# Patient Record
Sex: FEMALE | Race: WHITE | NOT HISPANIC OR LATINO
[De-identification: names, ages, dates, MRNs, and addresses within clinical notes are randomized per-mention and may not be internally consistent; named-entity substitution may affect disease eponyms.]

---

## 2020-12-07 ENCOUNTER — APPOINTMENT (OUTPATIENT)
Dept: NEUROLOGY | Facility: CLINIC | Age: 65
End: 2020-12-07
Payer: MEDICARE

## 2020-12-07 VITALS
WEIGHT: 174 LBS | HEIGHT: 66 IN | SYSTOLIC BLOOD PRESSURE: 133 MMHG | DIASTOLIC BLOOD PRESSURE: 74 MMHG | HEART RATE: 67 BPM | BODY MASS INDEX: 27.97 KG/M2

## 2020-12-07 DIAGNOSIS — L57.0 ACTINIC KERATOSIS: ICD-10-CM

## 2020-12-07 DIAGNOSIS — Z78.9 OTHER SPECIFIED HEALTH STATUS: ICD-10-CM

## 2020-12-07 DIAGNOSIS — Z86.39 PERSONAL HISTORY OF OTHER ENDOCRINE, NUTRITIONAL AND METABOLIC DISEASE: ICD-10-CM

## 2020-12-07 DIAGNOSIS — Z82.0 FAMILY HISTORY OF EPILEPSY AND OTHER DISEASES OF THE NERVOUS SYSTEM: ICD-10-CM

## 2020-12-07 DIAGNOSIS — Z87.2 PERSONAL HISTORY OF DISEASES OF THE SKIN AND SUBCUTANEOUS TISSUE: ICD-10-CM

## 2020-12-07 DIAGNOSIS — K31.89 OTHER DISEASES OF STOMACH AND DUODENUM: ICD-10-CM

## 2020-12-07 PROCEDURE — 99214 OFFICE O/P EST MOD 30 MIN: CPT

## 2020-12-07 RX ORDER — NIACINAMIDE 500 MG
TABLET ORAL
Refills: 0 | Status: ACTIVE | COMMUNITY

## 2020-12-07 RX ORDER — COLD-HOT PACK
50 EACH MISCELLANEOUS
Refills: 0 | Status: ACTIVE | COMMUNITY

## 2020-12-07 RX ORDER — LEVOTHYROXINE SODIUM 0.05 MG/1
50 TABLET ORAL
Refills: 0 | Status: ACTIVE | COMMUNITY

## 2020-12-07 NOTE — CONSULT LETTER
[Dear  ___] : Dear  [unfilled], [Consult Letter:] : I had the pleasure of evaluating your patient, [unfilled]. [Please see my note below.] : Please see my note below. [Consult Closing:] : Thank you very much for allowing me to participate in the care of this patient.  If you have any questions, please do not hesitate to contact me. [Sincerely,] : Sincerely, [FreeTextEntry3] : Boby Kelly MD\par

## 2020-12-07 NOTE — PHYSICAL EXAM
[FreeTextEntry1] : Constitutional:  Patient was well-developed, well-nourished and in no acute distress. \par \par Head:  Normocephalic, atraumatic. Tympanic membranes were clear. \par \par Neck:  Supple with full range of motion. \par \par Cardiovascular:  Cardiac rhythm was regular without murmur. There were no carotid bruits. Peripheral pulses were full and symmetric. \par \par Respiratory:  Lungs were clear. \par \par Abdomen:  Soft and nontender. \par \par Spine:  Nontender. \par \par Skin:  There were no rashes. \par \par NEUROLOGICAL EXAMINATION:\par \par Mental Status: Patient was alert and oriented. Speech was fluent. There was no dysarthria.  She scored 29 out of 30 on Mini-Mental state examination making 1 error in recall.\par \par Cranial Nerves: \par \par II: Visual acuity was 20/30-1 in the right eye and 20/25-1 in the left eye with contact lenses.  Pupils were equal and reactive. Visual fields were full. Funduscopic examination was normal. \par \par III, IV, VI:  Eye movements were full without nystagmus. \par \par V: Facial sensation was intact. \par \par VII: Facial strength was normal. \par \par VIII: Hearing was equal. \par \par IX, X: Palatal movement was normal. Phonation was normal. \par \par XI: Sternocleidomastoids and trapezii were normal. \par \par XII: Tongue was midline and movements normal. There was no lingual atrophy or fasciculations. \par \par Motor Examination: Muscle bulk, tone and strength were normal. \par \par Sensory Examination: Pinprick, vibration and joint position sense were intact. \par \par Reflexes: DTRs were 2+ throughout. \par \par Plantar Responses: Plantar responses were flexor. \par \par Coordination/Cerebellar Function: There was no dysmetria on finger to nose or heel to shin testing. \par \par Gait/Stance: Gait and tandem were normal. Romberg was negative.\par

## 2020-12-07 NOTE — ASSESSMENT
[FreeTextEntry1] : Mrs. Perlman is a 65-year-old with persistent amnestic complaints.  Despite her symptoms, she continues to function extremely well.  There has been no decline in her MMSE score.  I have attempted to reassure her.  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  She will be reevaluated in 1 year.  Telephone contact will be maintained in the meantime.

## 2020-12-07 NOTE — HISTORY OF PRESENT ILLNESS
[FreeTextEntry1] : Mrs. Sally Perlman returned to the office having been initially evaluated on January 29, 2020.  She is a 65-year-old right-handed patient who presented with subacute progressive predominantly amnestic symptoms.  Her neurologic examination was nonfocal.  Differential diagnosis included normal aging, cerebrovascular disease and neurodegenerative disorders like Alzheimer's and Parkinson's.  She had a probable incidental frontal extra-axial mass likely representing a small meningioma.  She had a remote history of treated Lyme disease.\par \par Updated MRI of the brain revealed a calcified lesion of the anterior falx consistent with meningioma.  The study was otherwise unremarkable.  MR venogram revealed no evidence of severe stenosis or occlusion of the dural venous sinuses.  Lyme Western blot IgG was negative.  TSH was normal and syphilis IgG was nonreactive.  Autoimmune dementia evaluation was negative.\par \par Mrs. Perlman complains that her memory is not great.  She however is totally independent.  She is able to dress, bathe, toilet herself, drive her car and pay her bills.  She complains of itching in her ears and arms.  She describes fleeting discomfort in her posterior thighs and calf without back pain.

## 2021-12-07 ENCOUNTER — APPOINTMENT (OUTPATIENT)
Dept: NEUROLOGY | Facility: CLINIC | Age: 66
End: 2021-12-07

## 2022-10-12 ENCOUNTER — NON-APPOINTMENT (OUTPATIENT)
Age: 67
End: 2022-10-12

## 2022-10-24 ENCOUNTER — APPOINTMENT (OUTPATIENT)
Dept: NEUROLOGY | Facility: CLINIC | Age: 67
End: 2022-10-24

## 2022-10-24 VITALS
WEIGHT: 166 LBS | BODY MASS INDEX: 26.68 KG/M2 | HEIGHT: 66 IN | SYSTOLIC BLOOD PRESSURE: 127 MMHG | HEART RATE: 56 BPM | DIASTOLIC BLOOD PRESSURE: 76 MMHG

## 2022-10-24 PROCEDURE — 99214 OFFICE O/P EST MOD 30 MIN: CPT

## 2022-10-24 NOTE — PHYSICAL EXAM
[FreeTextEntry1] : Constitutional:  Patient was well-developed, well-nourished and in no acute distress. \par \par Head:  Normocephalic, atraumatic. Tympanic membranes were clear. \par \par Neck:  Supple with full range of motion. \par \par Cardiovascular:  Cardiac rhythm was regular without murmur. There were no carotid bruits. Peripheral pulses were full and symmetric. \par \par Respiratory:  Lungs were clear. \par \par Abdomen:  Soft and nontender. \par \par Spine:  Nontender. \par \par Skin:  There were no rashes. \par \par NEUROLOGICAL EXAMINATION:\par \par Mental Status: Patient was alert and oriented. Speech was fluent. There was no dysarthria.  She scored 29 out of 30 on Mini-Mental state examination making 1 error in recall.  This was the same score as her last visit.\par \par Cranial Nerves: \par \par II: She could finger count bilaterally.  Pupils were equal and reactive. Visual fields were full. Funduscopic examination was normal. \par \par III, IV, VI:  Eye movements were full without nystagmus. \par \par V: Facial sensation was intact. \par \par VII: Facial strength was normal. \par \par VIII: Hearing was equal. \par \par IX, X: Palatal movement was normal. Phonation was normal. \par \par XI: Sternocleidomastoids and trapezii were normal. \par \par XII: Tongue was midline and movements normal. There was no lingual atrophy or fasciculations. \par \par Motor Examination: Muscle bulk, tone and strength were normal. \par \par Sensory Examination: Pinprick, vibration and joint position sense were intact. \par \par Reflexes: DTRs were 2+ throughout. \par \par Plantar Responses: Plantar responses were flexor. \par \par Coordination/Cerebellar Function: There was no dysmetria on finger to nose or heel to shin testing. \par \par Gait/Stance: Gait and tandem were normal. Romberg was negative.\par

## 2022-10-24 NOTE — ASSESSMENT
[FreeTextEntry1] : Mrs. Perlman is a 67-year-old with persistent amnestic complaints.  There has been no decline on her testing.  She is entirely functional.  I suggested that she undergo an updated MRI of the brain with and without contrast including neuro quant.  I discussed other diagnostic options including being CSF analysis and FDG PET scan.  I do not believe that those studies are indicated at this time.  Further management will depend upon her MRI result and her clinical course.

## 2023-05-12 ENCOUNTER — APPOINTMENT (OUTPATIENT)
Dept: NEUROLOGY | Facility: CLINIC | Age: 68
End: 2023-05-12
Payer: MEDICARE

## 2023-05-12 VITALS
HEIGHT: 66 IN | HEART RATE: 57 BPM | WEIGHT: 165 LBS | DIASTOLIC BLOOD PRESSURE: 81 MMHG | SYSTOLIC BLOOD PRESSURE: 151 MMHG | BODY MASS INDEX: 26.52 KG/M2

## 2023-05-12 DIAGNOSIS — D32.0 BENIGN NEOPLASM OF CEREBRAL MENINGES: ICD-10-CM

## 2023-05-12 DIAGNOSIS — R41.3 OTHER AMNESIA: ICD-10-CM

## 2023-05-12 DIAGNOSIS — M48.061 SPINAL STENOSIS, LUMBAR REGION WITHOUT NEUROGENIC CLAUDICATION: ICD-10-CM

## 2023-05-12 PROCEDURE — 99214 OFFICE O/P EST MOD 30 MIN: CPT

## 2023-05-12 NOTE — PHYSICAL EXAM
[FreeTextEntry1] : Constitutional:  Patient was well-developed, well-nourished and in no acute distress. \par \par Head:  Normocephalic, atraumatic. Tympanic membranes were clear. \par \par Neck:  Supple with full range of motion. \par \par Cardiovascular:  Cardiac rhythm was regular without murmur. There were no carotid bruits. Peripheral pulses were full and symmetric. \par \par Respiratory:  Lungs were clear. \par \par Abdomen:  Soft and nontender. \par \par Spine:  Nontender. \par \par Skin:  There were no rashes. \par \par NEUROLOGICAL EXAMINATION:\par \par Mental Status: Patient was alert and oriented. Speech was fluent. There was no dysarthria.  She again scored 29 out of 30 on MMSE.  She incorrectly stated the date as the 13th.\par \par Cranial Nerves: \par \par II: She could finger count bilaterally.  Pupils were surgical but reactive. Visual fields were full.  The left optic disc was sharp and the right was poorly visualized.\par \par III, IV, VI:  Eye movements were full without nystagmus. \par \par V: Facial sensation was intact. \par \par VII: Facial strength was normal. \par \par VIII: Hearing was equal. \par \par IX, X: Palatal movement was normal. Phonation was normal. \par \par XI: Sternocleidomastoids and trapezii were normal. \par \par XII: Tongue was midline and movements normal. There was no lingual atrophy or fasciculations. \par \par Motor Examination: Muscle bulk, tone and strength were normal. \par \par Sensory Examination: Pinprick, vibration and joint position sense were intact. \par \par Reflexes: DTRs were 2+ throughout. \par \par Plantar Responses: Plantar responses were flexor. \par \par Coordination/Cerebellar Function: There was no dysmetria on finger to nose or heel to shin testing. \par \par Gait/Stance: Gait and tandem were normal. Romberg was negative.\par

## 2023-05-12 NOTE — ASSESSMENT
[FreeTextEntry1] : Mrs. Perlman is a 68-year-old with persistent amnestic complaints.  I suggested an updated MRI of the brain to assess for small falx meningioma.  I advised against FDG PET scanning given her excellent level of function.  Regarding her lumbar stenosis and cervical discomfort, an x-ray of the cervical spine will be performed.  I provided her with a prescription for core strengthening and physical therapy.  Further management will depend upon her clinical course.

## 2023-05-12 NOTE — HISTORY OF PRESENT ILLNESS
[FreeTextEntry1] : Mrs. Sally Perlman returned to the office having been last seen on October 24, 2022.  She is a 68-year-old right-handed patient who presented with subacute progressive predominantly amnestic symptoms.  Her neurologic examination was nonfocal.  Differential diagnosis included normal aging, cerebrovascular disease and neurodegenerative disorders like Alzheimer's and Parkinson's.  She had a probable incidental frontal extra-axial mass likely representing a small meningioma.  She had a remote history of treated Lyme disease.\par \par MRI of the brain performed in March 2020 revealed a calcified lesion of the anterior falx consistent with meningioma.  The study was otherwise unremarkable.  MR venogram revealed no evidence of severe stenosis or occlusion of the dural venous sinuses.  Lyme Western blot IgG was negative.  TSH was normal and syphilis IgG was nonreactive.  Autoimmune dementia evaluation was negative.\par \par Mrs. Perlman continues to report difficulties with memory.  Despite this, she is totally independent.  Is been no change in personality.  She complains of occasional neck and low back pain.  She has known lumbar spondylosis and stenosis.  She has not yet undergone an updated MRI of the cervical spine to assess for left proximal meningioma.\par \par Medications include levothyroxine, niacinamide, zinc, vitamin B12 and vitamin D3.

## 2023-06-08 ENCOUNTER — NON-APPOINTMENT (OUTPATIENT)
Age: 68
End: 2023-06-08

## 2023-06-26 ENCOUNTER — APPOINTMENT (OUTPATIENT)
Dept: NEUROLOGY | Facility: CLINIC | Age: 68
End: 2023-06-26
Payer: MEDICARE

## 2023-06-26 VITALS
SYSTOLIC BLOOD PRESSURE: 138 MMHG | DIASTOLIC BLOOD PRESSURE: 83 MMHG | WEIGHT: 165 LBS | BODY MASS INDEX: 27.49 KG/M2 | HEIGHT: 65 IN | HEART RATE: 55 BPM

## 2023-06-26 DIAGNOSIS — G31.84 MILD COGNITIVE IMPAIRMENT, SO STATED: ICD-10-CM

## 2023-06-26 DIAGNOSIS — M54.16 RADICULOPATHY, LUMBAR REGION: ICD-10-CM

## 2023-06-26 DIAGNOSIS — M54.2 CERVICALGIA: ICD-10-CM

## 2023-06-26 PROCEDURE — 99214 OFFICE O/P EST MOD 30 MIN: CPT

## 2023-06-26 NOTE — PHYSICAL EXAM
[FreeTextEntry1] : Constitutional:  Patient was well-developed, well-nourished and in no acute distress. \par \par Head:  Normocephalic, atraumatic. Tympanic membranes were clear. \par \par Neck: There was mild tenderness with diffuse limitation of movement.\par \par Cardiovascular:  Cardiac rhythm was regular without murmur. There were no carotid bruits. Peripheral pulses were full and symmetric. \par \par Respiratory:  Lungs were clear. \par \par Abdomen:  Soft and nontender. \par \par Spine: There was mild lumbar tenderness.  There is no pain on straight leg raising.  Alfredo's maneuver was negative.\par \par Skin:  There were no rashes. \par \par NEUROLOGICAL EXAMINATION:\par \par Mental Status: Patient was alert and oriented. Speech was fluent. There was no dysarthria.  \par \par Cranial Nerves: \par \par II: She could finger count bilaterally.  Pupils were surgical but reactive. Visual fields were full.  The left optic disc was sharp and the right was poorly visualized.\par \par III, IV, VI:  Eye movements were full without nystagmus. \par \par V: Facial sensation was intact. \par \par VII: Facial strength was normal. \par \par VIII: Hearing was equal. \par \par IX, X: Palatal movement was normal. Phonation was normal. \par \par XI: Sternocleidomastoids and trapezii were normal. \par \par XII: Tongue was midline and movements normal. There was no lingual atrophy or fasciculations. \par \par Motor Examination: Muscle bulk, tone and strength were normal. \par \par Sensory Examination: Pinprick, vibration and joint position sense were intact. \par \par Reflexes: DTRs were 2+ throughout. \par \par Plantar Responses: Plantar responses were flexor. \par \par Coordination/Cerebellar Function: There was no dysmetria on finger to nose or heel to shin testing. \par \par Gait/Stance: Gait and tandem were normal. Romberg was negative.\par

## 2023-06-26 NOTE — HISTORY OF PRESENT ILLNESS
[FreeTextEntry1] : Mrs. Sally Perlman returned to the office having been last seen on May 12, 2023.  She is a 68-year-old right-handed patient who presented with subacute progressive predominantly amnestic symptoms.  Her neurologic examination was nonfocal.  Differential diagnosis included normal aging, cerebrovascular disease and neurodegenerative disorders like Alzheimer's and Parkinson's.  She had a probable incidental frontal extra-axial mass likely representing a small meningioma.  She had a remote history of treated Lyme disease.\par \par MRI of the brain performed in March 2020 revealed a calcified lesion of the anterior falx consistent with meningioma.  The study was otherwise unremarkable.  MR venogram revealed no evidence of severe stenosis or occlusion of the dural venous sinuses.  Lyme Western blot IgG was negative.  TSH was normal and syphilis IgG was nonreactive.  Autoimmune dementia evaluation was negative.\par \par When seen in May 2023, Mrs. Perlman continued to report difficulties with memory.  Despite this, she was totally independent.  There was no change in personality.  She complained of occasional neck and low back pain.  She had known lumbar spondylosis and stenosis.  She had not yet undergone an updated MRI of the brain to assess for anterior falx meningioma.\par \par MRI of the brain revealed probable benign ossification along the anterior falx.  There was scattered mucosal membrane thickening in the paranasal sinuses.  X-ray of the cervical spine revealed straightening without evidence of compression fracture or subluxation.  There was mild degenerative anterior spondylosis at C4-5 and mild degenerative disc disease and left uncovertebral hypertrophy slightly narrowing the foramen.  At C5-6, there was moderate degenerative disc disease and mild posterior spur formation slightly narrowing the left foramen.\par \par She reports no change in her mild memory problems.  She has chronic neck pain and limited movement without radiculopathy.  She complains of 2 to 3-year history of low back pain.  More recently she has been experiencing pain radiating to her right buttock and posterior thigh.  This increases with ambulation.  She has known right knee disease including meniscus disease and a popliteal cyst for which she underwent an injection by orthopedics.\par \par Medications include levothyroxine, niacinamide, zinc, vitamin B12 and vitamin D3.

## 2023-06-26 NOTE — ASSESSMENT
[FreeTextEntry1] : Mrs. Perlman is a 68-year-old with persistent amnestic complaints.  She has chronic cervical and lumbar complaints.  She reports new right-sided sciatica.  I suggested that she undergo MRIs of the cervical and lumbar spine.  I provided her prescription for physical therapy.  Further management will depend upon those results and her clinical course.

## 2023-07-03 ENCOUNTER — RESULT REVIEW (OUTPATIENT)
Age: 68
End: 2023-07-03

## 2023-07-05 ENCOUNTER — NON-APPOINTMENT (OUTPATIENT)
Age: 68
End: 2023-07-05

## 2023-08-10 ENCOUNTER — RESULT REVIEW (OUTPATIENT)
Age: 68
End: 2023-08-10

## 2023-08-10 ENCOUNTER — NON-APPOINTMENT (OUTPATIENT)
Age: 68
End: 2023-08-10

## 2024-03-12 ENCOUNTER — APPOINTMENT (OUTPATIENT)
Dept: NEUROLOGY | Facility: CLINIC | Age: 69
End: 2024-03-12

## 2025-04-09 ENCOUNTER — NON-APPOINTMENT (OUTPATIENT)
Age: 70
End: 2025-04-09

## 2025-04-11 ENCOUNTER — APPOINTMENT (OUTPATIENT)
Dept: NEUROLOGY | Facility: CLINIC | Age: 70
End: 2025-04-11
Payer: MEDICARE

## 2025-04-11 VITALS
SYSTOLIC BLOOD PRESSURE: 130 MMHG | DIASTOLIC BLOOD PRESSURE: 70 MMHG | WEIGHT: 155 LBS | HEART RATE: 54 BPM | BODY MASS INDEX: 25.83 KG/M2 | HEIGHT: 65 IN

## 2025-04-11 DIAGNOSIS — G31.84 MILD COGNITIVE IMPAIRMENT, SO STATED: ICD-10-CM

## 2025-04-11 DIAGNOSIS — M48.061 SPINAL STENOSIS, LUMBAR REGION WITHOUT NEUROGENIC CLAUDICATION: ICD-10-CM

## 2025-04-11 PROCEDURE — 99213 OFFICE O/P EST LOW 20 MIN: CPT

## 2025-08-06 ENCOUNTER — APPOINTMENT (OUTPATIENT)
Dept: NEUROLOGY | Facility: CLINIC | Age: 70
End: 2025-08-06
Payer: MEDICARE

## 2025-08-06 VITALS
HEART RATE: 56 BPM | WEIGHT: 150 LBS | DIASTOLIC BLOOD PRESSURE: 75 MMHG | BODY MASS INDEX: 24.99 KG/M2 | HEIGHT: 65 IN | SYSTOLIC BLOOD PRESSURE: 131 MMHG

## 2025-08-06 DIAGNOSIS — G47.62 SLEEP RELATED LEG CRAMPS: ICD-10-CM

## 2025-08-06 DIAGNOSIS — R20.2 PARESTHESIA OF SKIN: ICD-10-CM

## 2025-08-06 PROCEDURE — 99213 OFFICE O/P EST LOW 20 MIN: CPT

## 2025-08-06 RX ORDER — BACLOFEN 5 MG/1
5 TABLET ORAL
Qty: 90 | Refills: 0 | Status: ACTIVE | COMMUNITY
Start: 2025-08-06 | End: 1900-01-01